# Patient Record
Sex: FEMALE | Race: WHITE | NOT HISPANIC OR LATINO | Employment: FULL TIME | URBAN - METROPOLITAN AREA
[De-identification: names, ages, dates, MRNs, and addresses within clinical notes are randomized per-mention and may not be internally consistent; named-entity substitution may affect disease eponyms.]

---

## 2017-03-06 ENCOUNTER — HOSPITAL ENCOUNTER (EMERGENCY)
Facility: HOSPITAL | Age: 47
Discharge: HOME | End: 2017-03-06
Attending: EMERGENCY MEDICINE

## 2017-03-06 VITALS
TEMPERATURE: 98 F | OXYGEN SATURATION: 99 % | DIASTOLIC BLOOD PRESSURE: 53 MMHG | BODY MASS INDEX: 20.09 KG/M2 | SYSTOLIC BLOOD PRESSURE: 106 MMHG | HEIGHT: 66 IN | RESPIRATION RATE: 18 BRPM | HEART RATE: 60 BPM | WEIGHT: 125 LBS

## 2017-03-06 DIAGNOSIS — R55 VASOVAGAL SYNCOPE: Primary | ICD-10-CM

## 2017-03-06 LAB
ANION GAP SERPL CALC-SCNC: 8 MMOL/L
BASOPHILS # BLD AUTO: 0.07 K/UL
BASOPHILS NFR BLD: 0.7 %
BUN SERPL-MCNC: 21 MG/DL
CALCIUM SERPL-MCNC: 9.4 MG/DL
CHLORIDE SERPL-SCNC: 108 MMOL/L
CO2 SERPL-SCNC: 28 MMOL/L
CREAT SERPL-MCNC: 1 MG/DL
DIFFERENTIAL METHOD: ABNORMAL
EOSINOPHIL # BLD AUTO: 0.2 K/UL
EOSINOPHIL NFR BLD: 1.7 %
ERYTHROCYTE [DISTWIDTH] IN BLOOD BY AUTOMATED COUNT: 13.5 %
EST. GFR  (AFRICAN AMERICAN): >60 ML/MIN/1.73 M^2
EST. GFR  (NON AFRICAN AMERICAN): >60 ML/MIN/1.73 M^2
GLUCOSE SERPL-MCNC: 70 MG/DL
HCT VFR BLD AUTO: 36.7 %
HGB BLD-MCNC: 12.7 G/DL
LYMPHOCYTES # BLD AUTO: 1.9 K/UL
LYMPHOCYTES NFR BLD: 19.5 %
MCH RBC QN AUTO: 31.7 PG
MCHC RBC AUTO-ENTMCNC: 34.6 %
MCV RBC AUTO: 92 FL
MONOCYTES # BLD AUTO: 0.8 K/UL
MONOCYTES NFR BLD: 8 %
NEUTROPHILS # BLD AUTO: 6.8 K/UL
NEUTROPHILS NFR BLD: 70.1 %
PLATELET # BLD AUTO: 213 K/UL
PMV BLD AUTO: 9.1 FL
POCT GLUCOSE: 112 MG/DL (ref 70–110)
POTASSIUM SERPL-SCNC: 3.9 MMOL/L
RBC # BLD AUTO: 4.01 M/UL
SODIUM SERPL-SCNC: 144 MMOL/L
TROPONIN I SERPL DL<=0.01 NG/ML-MCNC: <0.006 NG/ML
WBC # BLD AUTO: 9.72 K/UL

## 2017-03-06 PROCEDURE — 36415 COLL VENOUS BLD VENIPUNCTURE: CPT

## 2017-03-06 PROCEDURE — 80048 BASIC METABOLIC PNL TOTAL CA: CPT

## 2017-03-06 PROCEDURE — 93005 ELECTROCARDIOGRAM TRACING: CPT

## 2017-03-06 PROCEDURE — 93010 ELECTROCARDIOGRAM REPORT: CPT | Mod: ,,, | Performed by: INTERNAL MEDICINE

## 2017-03-06 PROCEDURE — 84484 ASSAY OF TROPONIN QUANT: CPT

## 2017-03-06 PROCEDURE — 85025 COMPLETE CBC W/AUTO DIFF WBC: CPT

## 2017-03-06 PROCEDURE — 82962 GLUCOSE BLOOD TEST: CPT

## 2017-03-06 PROCEDURE — 99284 EMERGENCY DEPT VISIT MOD MDM: CPT | Mod: 25

## 2017-03-06 RX ORDER — NAPROXEN 500 MG/1
500 TABLET ORAL 2 TIMES DAILY WITH MEALS
Qty: 30 TABLET | Refills: 0 | Status: SHIPPED | OUTPATIENT
Start: 2017-03-06

## 2017-03-06 NOTE — ED AVS SNAPSHOT
[facility] -   [address]  [place] [address]               Yanira Arellano   3/6/2017  7:15 PM   ED    Description:  Female : 1970   Department:  [facility] -            Your Care was Coordinated By:     Provider Role From To    Dorothy Mary MD Attending Provider 17 1921 17    Blayne Mcgovern MD Attending Provider 17 --      Reason for Visit     Loss of Consciousness           Diagnoses this Visit        Comments    Vasovagal syncope    -  Primary       ED Disposition     ED Disposition Condition Comment    Discharge             To Do List           Follow-up Information     Follow up with Peter Dugan MD In 1 week(s).    Specialty:  Family Medicine    Contact information:    [facility] [place] [address] [phone]          Follow up with with this emergency department In 1 day(s).    Why:  If symptoms worsen, Or worsening condition or any other major concern       These Medications        Disp Refills Start End    naproxen (NAPROSYN) 500 MG tablet 30 tablet 0 3/6/2017     Take 1 tablet (500 mg total) by mouth 2 (two) times daily with meals. - Oral      [facility]     [facility] -  Assistance  Registered nurses in the [facility] provide clinical advisement, health education, appointment booking, and other advisory services.  Call for this free service at [phone].             Medications           START taking these NEW medications        Refills    naproxen (NAPROSYN) 500 MG tablet 0    Sig: Take 1 tablet (500 mg total) by mouth 2 (two) times daily with meals.    Class: Print    Route: Oral           Verify that the below list of medications is an accurate representation of the medications you are currently taking.  If none reported, the list may be blank. If incorrect, please contact your healthcare provider. Carry this list with you in case  "of emergency.           Current Medications     naproxen (NAPROSYN) 500 MG tablet Take 1 tablet (500 mg total) by mouth 2 (two) times daily with meals.           Clinical Reference Information           Your Vitals Were     BP Pulse Temp Resp Height Weight    110/55 (Patient Position: Standing) 70 98.2 °F (36.8 °C) (Oral) 18 5' 6" (1.676 m) 56.7 kg (125 lb)    SpO2 BMI             98% 20.18 kg/m2         Allergies as of 3/6/2017        Reactions    Iodine And Iodide Containing Products Hives      Immunizations Administered on Date of Encounter - 3/6/2017     None      ED Micro, Lab, POCT     Start Ordered       Status Ordering Provider    03/06/17 1949 03/06/17 1949  POCT glucose  Once      Final result     03/06/17 1937 03/06/17 1936  Basic metabolic panel  STAT      Final result     03/06/17 1937 03/06/17 1936  Troponin I  STAT      Final result     03/06/17 1936 03/06/17 1936  CBC auto differential  STAT      Final result     03/06/17 1922 03/06/17 1921  POCT glucose  Once      Acknowledged       ED Imaging Orders     Start Ordered       Status Ordering Provider    03/06/17 1921 03/06/17 1920  CT Head Without Contrast  1 time imaging      Final result     03/06/17 1921 03/06/17 1920  CT Cervical Spine Without Contrast  1 time imaging      Final result         Discharge Instructions           Fainting: Vagal Reaction  Fainting (syncope) is a temporary loss of consciousness. Its also called passing out. It occurs when blood flow to the brain is less than normal. Your health care provider believes that your fainting was because of a vagal reaction. This condition is not a sign of serious disease.  A vagal reaction is a response in your body that causes your pulse to slow down or the blood vessels to expand. This causes your blood pressure to fall. And this sends less blood to your brain if you are standing or sitting. That results in dizziness, near-fainting, or fainting. Lying down usually stops the reaction " within 60 seconds.  This response can occur during sudden fear, severe pain, emotional stress, overexertion, overheating, hunger, nausea or vomiting, prolonged standing, or standing up after sitting or lying for a long time.  Home care  Follow these guidelines when caring for yourself at home:  · Rest today. Go back to your normal activities as soon as you are feeling back to normal.  · If you feel lightheaded or dizzy, lie down right away. Or sit with your head lowered between your knees.  Follow-up care  Follow up with your health care provider, or as advised.  When to seek medical advice  Call your health care provider right away if any of these occur:  · Another fainting spell thats not explained by the common causes listed above  · Pain in your chest, arm, neck, jaw, back, or abdomen  · Shortness of breath  · Severe headache or seizure  · Blood in vomit or stools (black or red color)  · Unexpected vaginal bleeding  · Your heart beats very rapidly, very slowly, or irregularly (palpitations)  Also call your provider if you have signs of stroke:  · Weakness in an arm or leg or on one side of the face  · Difficulty speaking or seeing  · Extreme drowsiness, confusion, dizziness, or fainting   Date Last Reviewed: 11/25/2014  © 5522-3331 [employer]. [address] [place] [address], [place] [address]. All rights reserved. This information is not intended as a substitute for professional medical care. Always follow your healthcare professional's instructions.          [facility] Sign-Up     Activating your [facility] account is as easy as 1-2-3!     1) Visit [email or web address], select Sign Up Now, enter this activation code and your date of birth, then select Next.  [email or web address]  Expires: 4/20/2017  9:13 PM      2) Create a username and password to use when you visit [facility] in the future and select a security question in case you lose your password and select Next.    3) Enter your e-mail address and click Sign  Up!    Additional Information  If you have questions, please e-mail [email or web address] or call [phone] to talk to our [facility] staff. Remember, [facility] is NOT to be used for urgent needs. For medical emergencies, dial 911.         Smoking Cessation     If you would like to quit smoking:   You may be eligible for free services if you are a [place] resident and started smoking cigarettes before September 1, 1988.  Call the Smoking Cessation [employer] (SCT) toll free at (608) 634-5224 or (069) 438-8812.   Call 2-128-QUIT-NOW if you do not meet the above criteria.             [facility] complies with applicable Federal civil rights laws and does not discriminate on the basis of race, color, national origin, age, disability, or sex.        Language Assistance Services     ATTENTION: Language assistance services are available, free of charge. Please call [phone].      ATENCIÓN: Si habla español, tiene a case disposición servicios gratuitos de asistencia lingüística. Llame al [phone].     [facility] Ý: N?u b?n nói Ti?ng Vi?t, có các d?ch v? h? tr? ngôn ng? mi?n phí dành cho b?n. G?i s? [phone].

## 2017-03-07 NOTE — ED PROVIDER NOTES
"SCRIBE #1 NOTE: I, Mally Bennett, am scribing for, and in the presence of, Dorothy Mary MD.     SCRIBE #2 NOTE: I, Sarabjit Carlson, am scribing for, and in the presence of,  Blayne Mcgovern MD. I have scribed the remaining portions of the note not scribed by Scribe #1.     History      Chief Complaint   Patient presents with    Loss of Consciousness     had syncopal episode at home and fell from kitchen table chair onto floor. Hit left side of head on tile floor. passed out for approx 30 seconds per daughter.       Review of patient's allergies indicates:   Allergen Reactions    Iodine and iodide containing products Hives        HPI   HPI    3/6/2017, 7:29 PM   History obtained from the daughter and patient      History of Present Illness: Yanira Arellano is a 46 y.o. female patient who presents to the Emergency Department for syncope which onset suddenly PTA. Daughter states, "We were eating dinner when she said she felt like she had a piece of bread stuck in her throat. She rested her head on her arms and when she rolled her head to the side, her eyes rolled back in her head and she passed out." Daughter notes that pt slipped out of her chair during the episode and hit her head on the tile floor. Sx have been episodic and moderate in severity. No modifying factors noted. Pt notes that she had a similar episode a few years ago. Pt notes that she felt light-headed and nauseous following episode, but states that sx have resolved. Associated sx include left sided neck pain and a contusion to the left frontal scalp. Pt denies any fever, chills, CP, SOB, vomiting, diarrhea, back pain, dizziness, speech difficulty, or unilateral weakness/numbness. No further complaints at this time.       Arrival mode: Personal vehicle     PCP: Peter Dugan MD       Past Medical History:  No past medical history on file.    Past Surgical History:  No past surgical history on file.      Family History:  No family history on " file.    Social History:  Social History     Social History Main Topics    Smoking status: Not on file    Smokeless tobacco: Not on file    Alcohol use Not on file    Drug use: Not on file    Sexual activity: Not on file       ROS   Review of Systems   Constitutional: Negative for chills, diaphoresis and fever.   HENT: Negative for sore throat.         (+) scalp contusion   Eyes: Negative for visual disturbance.   Respiratory: Negative for shortness of breath.    Cardiovascular: Negative for chest pain.   Gastrointestinal: Positive for nausea. Negative for abdominal pain, blood in stool, constipation, diarrhea and vomiting.   Genitourinary: Negative for dysuria.   Musculoskeletal: Positive for neck pain. Negative for back pain and neck stiffness.   Skin: Negative for rash.   Neurological: Positive for syncope and light-headedness. Negative for dizziness, speech difficulty, weakness, numbness and headaches.   Hematological: Does not bruise/bleed easily.     Physical Exam    Initial Vitals   BP Pulse Resp Temp SpO2   03/06/17 1912 03/06/17 1912 03/06/17 1912 03/06/17 1912 03/06/17 1912   114/57 60 18 98.2 °F (36.8 °C) 98 %      Physical Exam  Nursing Notes and Vital Signs Reviewed.  Constitutional: Patient is in no acute distress. Awake and alert. Well-developed and well-nourished.  Head:  Normocephalic. Small left frontal soft tissue contusion, no step-offs or lacerations.   Eyes: PERRL. EOM intact. Conjunctivae are not pale. No scleral icterus.  ENT: Mucous membranes are moist. Oropharynx is clear and symmetric.    Neck: C-collar in place. No cervical midline bony tenderness, deformities, or step-offs.   Cardiovascular: Regular rate. Regular rhythm. No murmurs, rubs, or gallops. Distal pulses are 2+ and symmetric.  Pulmonary/Chest: No respiratory distress. Clear to auscultation bilaterally. No wheezing, rales, or rhonchi.  Abdominal: Soft and non-distended.  There is no tenderness.  No rebound, guarding, or  "rigidity.   Musculoskeletal: Moves all extremities. Pelvis is stable and non-tender. No obvious deformities. No edema. No calf tenderness.  Back:  No midline bony tenderness, deformities, or step-offs of the T-spine or L-spine.   Skin: Warm and dry.  Neurological:  Alert, awake, and appropriate.  Normal speech.  No acute focal neurological deficits are appreciated.  Psychiatric: Normal affect. Good eye contact. Appropriate in content.    ED Course    Procedures  ED Vital Signs:  Vitals:    03/06/17 1912 03/06/17 1948 03/06/17 1950 03/06/17 1952   BP: (!) 114/57 (!) 107/49 (!) 114/55 (!) 110/55   Pulse: 60 61 66 70   Resp: 18      Temp: 98.2 °F (36.8 °C)      TempSrc: Oral      SpO2: 98%      Weight: 56.7 kg (125 lb)      Height: 5' 6" (1.676 m)       03/06/17 2030 03/06/17 2135   BP: 102/62 (!) 106/53   Pulse: 62 60   Resp: 20 18   Temp: 98 °F (36.7 °C) 98.2 °F (36.8 °C)   TempSrc: Oral Oral   SpO2: 99% 99%   Weight:     Height:         Abnormal Lab Results:  Labs Reviewed   CBC W/ AUTO DIFFERENTIAL - Abnormal; Notable for the following:        Result Value    Hematocrit 36.7 (*)     MCH 31.7 (*)     MPV 9.1 (*)     All other components within normal limits   BASIC METABOLIC PANEL - Abnormal; Notable for the following:     BUN, Bld 21 (*)     All other components within normal limits   POCT GLUCOSE - Abnormal; Notable for the following:     POCT Glucose 112 (*)     All other components within normal limits   TROPONIN I        All Lab Results:  Results for orders placed or performed during the hospital encounter of 03/06/17   CBC auto differential   Result Value Ref Range    WBC 9.72 3.90 - 12.70 K/uL    RBC 4.01 4.00 - 5.40 M/uL    Hemoglobin 12.7 12.0 - 16.0 g/dL    Hematocrit 36.7 (L) 37.0 - 48.5 %    MCV 92 82 - 98 fL    MCH 31.7 (H) 27.0 - 31.0 pg    MCHC 34.6 32.0 - 36.0 %    RDW 13.5 11.5 - 14.5 %    Platelets 213 150 - 350 K/uL    MPV 9.1 (L) 9.2 - 12.9 fL    Gran # 6.8 1.8 - 7.7 K/uL    Lymph # 1.9 1.0 - 4.8 " K/uL    Mono # 0.8 0.3 - 1.0 K/uL    Eos # 0.2 0.0 - 0.5 K/uL    Baso # 0.07 0.00 - 0.20 K/uL    Gran% 70.1 38.0 - 73.0 %    Lymph% 19.5 18.0 - 48.0 %    Mono% 8.0 4.0 - 15.0 %    Eosinophil% 1.7 0.0 - 8.0 %    Basophil% 0.7 0.0 - 1.9 %    Differential Method Automated    Basic metabolic panel   Result Value Ref Range    Sodium 144 136 - 145 mmol/L    Potassium 3.9 3.5 - 5.1 mmol/L    Chloride 108 95 - 110 mmol/L    CO2 28 23 - 29 mmol/L    Glucose 70 70 - 110 mg/dL    BUN, Bld 21 (H) 6 - 20 mg/dL    Creatinine 1.0 0.5 - 1.4 mg/dL    Calcium 9.4 8.7 - 10.5 mg/dL    Anion Gap 8 8 - 16 mmol/L    eGFR if African American >60 >60 mL/min/1.73 m^2    eGFR if non African American >60 >60 mL/min/1.73 m^2   Troponin I   Result Value Ref Range    Troponin I <0.006 0.000 - 0.026 ng/mL   POCT glucose   Result Value Ref Range    POCT Glucose 112 (H) 70 - 110 mg/dL         Imaging Results:  Imaging Results         CT Cervical Spine Without Contrast (Final result) Result time:  03/06/17 20:49:24    Final result by Glenn Martinez MD (03/06/17 20:49:24)    Impression:         No acute findings noted involving the cervical spine.    All CT scans at this facility use dose modulation, iterative reconstruction, and/or weight based dosing when appropriate to reduce radiation dose to as low as reasonably achievable.      Electronically signed by: GLENN MARTINEZ MD  Date:     03/06/17  Time:    20:49     Narrative:    Exam: CT scan of the cervical spine without contrast    History:     Syncope.  Neck pain.    Findings:     No fracture, subluxation, disc space narrowing or acute osseous abnormality is identified. There is no evidence of spinal canal stenosis.  Arthritic changes involving the C3-C4, C4-C5 and C5-C6 facets the left.            CT Head Without Contrast (Final result) Result time:  03/06/17 20:46:38    Final result by Glenn Martinez MD (03/06/17 20:46:38)    Impression:     No acute findings.    All CT scans at this facility use dose  modulation, iterative reconstruction, and/or weight based dosing when appropriate to reduce radiation dose to as low as reasonably achievable.      Electronically signed by: GLENN MARTINEZ MD  Date:     03/06/17  Time:    20:46     Narrative:    Exam: Noncontrast Ct scan of the Head    History: Syncope    Findings: No intracranial hemorrhage or acute findings are demonstrated. The visualized paranasal sinuses are clear. The calvarium is intact.               The EKG was ordered, reviewed, and independently interpreted by the ED provider.  Interpretation time: 1905  Rate: 58 BPM  Rhythm: sinus bradycardia  Interpretation: Low voltage QRS. Borderline ECG.. No STEMI.         The Emergency Provider reviewed the vital signs and test results, which are outlined above.    ED Discussion     8:00 PM: Dr. Mary transfers care of pt to Dr. Mcogvern, pending lab and imaging results.    9:16 PM: Reassessed pt at this time. Pt is awake, alert, and in NAD. Pt states her condition has improved at this time. Discussed with pt all pertinent ED information and results. Discussed pt dx of vasovagal syncope and plan of tx. Gave pt all f/u and return to the ED instructions. All questions and concerns were addressed at this time. Pt expresses understanding of information and instructions, and is comfortable with plan to discharge. Pt is stable for discharge.    I discussed with patient and/or family/caretaker that evaluation in the ED does not suggest any emergent or life threatening medical conditions requiring immediate intervention beyond what was provided in the ED, and I believe patient is safe for discharge.  Regardless, an unremarkable evaluation in the ED does not preclude the development or presence of a serious of life threatening condition. As such, patient was instructed to return immediately for any worsening or change in current symptoms.    Pre-hypertension/Hypertension: The pt has been informed that they may have  pre-hypertension or hypertension based on a blood pressure reading in the ED. I recommend that the pt call the PCP listed on their discharge instructions or a physician of their choice this week to arrange f/u for further evaluation of possible pre-hypertension or hypertension.       ED Medication(s):  Medications - No data to display    Discharge Medication List as of 3/6/2017  9:13 PM      START taking these medications    Details   naproxen (NAPROSYN) 500 MG tablet Take 1 tablet (500 mg total) by mouth 2 (two) times daily with meals., Starting 3/6/2017, Until Discontinued, Print             Follow-up Information     Follow up with Peter Dugan MD In 1 week(s).    Specialty:  Family Medicine    Contact information:    [address] [place] [address] [phone]          Follow up with with this emergency department In 1 day(s).    Why:  If symptoms worsen, Or worsening condition or any other major concern            Medical Decision Making    Medical Decision Making:   Clinical Tests:   Lab Tests: Ordered and Reviewed  Radiological Study: Ordered and Reviewed  Medical Tests: Reviewed and Ordered           Scribe Attestation:   Scribe #1: I performed the above scribed service and the documentation accurately describes the services I performed. I attest to the accuracy of the note.    Attending:   Physician Attestation Statement for Scribe #1: I, Dorothy Mary MD, personally performed the services described in this documentation, as scribed by Mally Bennett, in my presence, and it is both accurate and complete.       Scribe Attestation:   Scribe #2: I performed the above scribed service and the documentation accurately describes the services I performed. I attest to the accuracy of the note.    Attending Attestation:           Physician Attestation for Scribe:    Physician Attestation Statement for Scribe #2: I, Blayne Mcgovern MD, reviewed documentation, as scribed by Sarabjit Carlson in my presence,  and it is both accurate and complete. I also acknowledge and confirm the content of the note done by Scribe #1.          Clinical Impression       ICD-10-CM ICD-9-CM   1. Vasovagal syncope R55 780.2       Disposition:   Disposition: Discharged  Condition: Stable         Blayne Mcgovern MD  03/08/17 0428

## 2017-03-07 NOTE — DISCHARGE INSTRUCTIONS
Fainting: Vagal Reaction  Fainting (syncope) is a temporary loss of consciousness. Its also called passing out. It occurs when blood flow to the brain is less than normal. Your health care provider believes that your fainting was because of a vagal reaction. This condition is not a sign of serious disease.  A vagal reaction is a response in your body that causes your pulse to slow down or the blood vessels to expand. This causes your blood pressure to fall. And this sends less blood to your brain if you are standing or sitting. That results in dizziness, near-fainting, or fainting. Lying down usually stops the reaction within 60 seconds.  This response can occur during sudden fear, severe pain, emotional stress, overexertion, overheating, hunger, nausea or vomiting, prolonged standing, or standing up after sitting or lying for a long time.  Home care  Follow these guidelines when caring for yourself at home:  · Rest today. Go back to your normal activities as soon as you are feeling back to normal.  · If you feel lightheaded or dizzy, lie down right away. Or sit with your head lowered between your knees.  Follow-up care  Follow up with your health care provider, or as advised.  When to seek medical advice  Call your health care provider right away if any of these occur:  · Another fainting spell thats not explained by the common causes listed above  · Pain in your chest, arm, neck, jaw, back, or abdomen  · Shortness of breath  · Severe headache or seizure  · Blood in vomit or stools (black or red color)  · Unexpected vaginal bleeding  · Your heart beats very rapidly, very slowly, or irregularly (palpitations)  Also call your provider if you have signs of stroke:  · Weakness in an arm or leg or on one side of the face  · Difficulty speaking or seeing  · Extreme drowsiness, confusion, dizziness, or fainting   Date Last Reviewed: 11/25/2014  © 4255-5255 [employer]. [address] [place] [address]  [place] [address]. All rights reserved. This information is not intended as a substitute for professional medical care. Always follow your healthcare professional's instructions.

## 2017-03-07 NOTE — ED NOTES
Pt to ER with c/o headache and neck pain s/p syncopal episode at 1800 in which pt was eating dinner, passed out for 30 sec and fell from chair onto head on tile floor.  No hematoma.  No blurred vision, dizziness